# Patient Record
Sex: FEMALE | Race: WHITE | NOT HISPANIC OR LATINO | Employment: STUDENT | URBAN - METROPOLITAN AREA
[De-identification: names, ages, dates, MRNs, and addresses within clinical notes are randomized per-mention and may not be internally consistent; named-entity substitution may affect disease eponyms.]

---

## 2022-07-27 ENCOUNTER — OFFICE VISIT (OUTPATIENT)
Dept: URGENT CARE | Facility: URGENT CARE | Age: 22
End: 2022-07-27
Payer: COMMERCIAL

## 2022-07-27 VITALS
DIASTOLIC BLOOD PRESSURE: 66 MMHG | BODY MASS INDEX: 23.54 KG/M2 | TEMPERATURE: 98.4 F | HEIGHT: 67 IN | RESPIRATION RATE: 16 BRPM | OXYGEN SATURATION: 99 % | SYSTOLIC BLOOD PRESSURE: 116 MMHG | WEIGHT: 150 LBS | HEART RATE: 65 BPM

## 2022-07-27 DIAGNOSIS — L23.9 ALLERGIC DERMATITIS: ICD-10-CM

## 2022-07-27 DIAGNOSIS — L97.412 SKIN ULCER OF RIGHT HEEL WITH FAT LAYER EXPOSED (H): ICD-10-CM

## 2022-07-27 DIAGNOSIS — S91.301A OPEN WOUND OF RIGHT HEEL, INITIAL ENCOUNTER: Primary | ICD-10-CM

## 2022-07-27 LAB
BASOPHILS # BLD MANUAL: 0 10E3/UL (ref 0–0.2)
BASOPHILS NFR BLD MANUAL: 0 %
EOSINOPHIL # BLD MANUAL: 0.8 10E3/UL (ref 0–0.7)
EOSINOPHIL NFR BLD MANUAL: 7 %
ERYTHROCYTE [DISTWIDTH] IN BLOOD BY AUTOMATED COUNT: 12.6 % (ref 10–15)
HCT VFR BLD AUTO: 38.9 % (ref 35–47)
HGB BLD-MCNC: 13.1 G/DL (ref 11.7–15.7)
LYMPHOCYTES # BLD MANUAL: 2.4 10E3/UL (ref 0.8–5.3)
LYMPHOCYTES NFR BLD MANUAL: 20 %
MCH RBC QN AUTO: 30.6 PG (ref 26.5–33)
MCHC RBC AUTO-ENTMCNC: 33.7 G/DL (ref 31.5–36.5)
MCV RBC AUTO: 91 FL (ref 78–100)
MONOCYTES # BLD MANUAL: 1.1 10E3/UL (ref 0–1.3)
MONOCYTES NFR BLD MANUAL: 9 %
NEUTROPHILS # BLD MANUAL: 7.6 10E3/UL (ref 1.6–8.3)
NEUTROPHILS NFR BLD MANUAL: 64 %
PLAT MORPH BLD: ABNORMAL
PLATELET # BLD AUTO: 306 10E3/UL (ref 150–450)
RBC # BLD AUTO: 4.28 10E6/UL (ref 3.8–5.2)
RBC MORPH BLD: ABNORMAL
WBC # BLD AUTO: 11.8 10E3/UL (ref 4–11)

## 2022-07-27 PROCEDURE — 87077 CULTURE AEROBIC IDENTIFY: CPT | Performed by: NURSE PRACTITIONER

## 2022-07-27 PROCEDURE — 99204 OFFICE O/P NEW MOD 45 MIN: CPT | Performed by: NURSE PRACTITIONER

## 2022-07-27 PROCEDURE — 87186 SC STD MICRODIL/AGAR DIL: CPT | Performed by: NURSE PRACTITIONER

## 2022-07-27 PROCEDURE — 85027 COMPLETE CBC AUTOMATED: CPT | Performed by: NURSE PRACTITIONER

## 2022-07-27 PROCEDURE — 87070 CULTURE OTHR SPECIMN AEROBIC: CPT | Performed by: NURSE PRACTITIONER

## 2022-07-27 PROCEDURE — 36415 COLL VENOUS BLD VENIPUNCTURE: CPT | Performed by: NURSE PRACTITIONER

## 2022-07-27 PROCEDURE — 85007 BL SMEAR W/DIFF WBC COUNT: CPT | Performed by: NURSE PRACTITIONER

## 2022-07-27 RX ORDER — TRIAMCINOLONE ACETONIDE 1 MG/G
CREAM TOPICAL 2 TIMES DAILY
Qty: 80 G | Refills: 0 | Status: SHIPPED | OUTPATIENT
Start: 2022-07-27 | End: 2022-08-03

## 2022-07-27 NOTE — PROGRESS NOTES
"Chief Complaint   Patient presents with     Urgent Care     Allergic Reaction     Pt has itching hand and leg and swelling due to taking dicloxacillin.      SUBJECTIVE:  Saskia Enciso is a 22 year old female who presents to the clinic today with large pale white ulcer down past fatty tissue that developed in [place] a [place] ago, worsening in the past days despite 2 I&Ds and 2 different antibiotics. She just got home from [place] today and is supposed to move to [place] tomorrow. She had an allergic reaction to dicloxacillin after 2 doses, red burning swollen dry hands, feet, scalp. That is improving except palms are still red, dry and itchy. Now she has been on erythromycin for 4 days and had 2 separate I&Ds. She is still in pain at the right heel with swelling, redness, trouble bearing weight, 1x1 inch deep white ulceration. No fevers for over a week. No shortness of breath, throat closure, syncope, gi upset.    No past medical history on file.  No current outpatient medications on file prior to visit.  No current facility-administered medications on file prior to visit.    Social History     Tobacco Use     Smoking status: Never Smoker     Smokeless tobacco: Never Used   Substance Use Topics     Alcohol use: Not on file     Allergies   Allergen Reactions     Dicloxacillin Rash     Review of Systems   All systems negative except for those listed above in HPI.    EXAM:   /66   Pulse 65   Temp 98.4  F (36.9  C) (Temporal)   Resp 16   Ht 1.702 m (5' 7\")   Wt 68 kg (150 lb)   SpO2 99%   BMI 23.49 kg/m      Physical Exam  Vitals reviewed.   Constitutional:       General: She is not in acute distress.     Appearance: Normal appearance. She is not ill-appearing, toxic-appearing or diaphoretic.   HENT:      Head: Normocephalic and atraumatic.   Cardiovascular:      Rate and Rhythm: Normal rate.   Pulmonary:      Effort: Pulmonary effort is normal.   Musculoskeletal:         General: Swelling, tenderness and " "signs of injury present. Normal range of motion.   Skin:     General: Skin is warm and dry.      Findings: Erythema, lesion and rash present. No bruising.      Comments: Bilateral palms dry, red, edematous. Right calcaneous with 1x1\" white deep pale ulcer down past muscle tissue, surrounding erythema and tenderness.   Neurological:      General: No focal deficit present.      Mental Status: She is alert and oriented to person, place, and time.   Psychiatric:         Mood and Affect: Mood normal.         Behavior: Behavior normal.       Xray done in clinic read by me as negative for osteomyelitis.  Results for orders placed or performed in visit on 07/27/22   XR Calcaneus Right G/E 2 Views     Status: None (Preliminary result)    Narrative    CALCANEUS RIGHT TWO OR MORE VIEWS   7/27/2022 4:26 PM     HISTORY: Large pale white ulcer down past muscle tissue that developed  in [place] a month ago, worsening in the past days despite 2 I&Ds  and 2 different antibiotics, concern for osteomyelitis; Open wound of  right heel, initial encounter.    COMPARISON: None.      Impression    IMPRESSION: There is an ulcer posterolateral to the calcaneal  tuberosity. The underlying bone appears normal. There is no evidence  of osteomyelitis or foreign body.   Results for orders placed or performed in visit on 07/27/22   CBC with platelets and differential     Status: None (In process)    Narrative    The following orders were created for panel order CBC with platelets and differential.  Procedure                               Abnormality         Status                     ---------                               -----------         ------                     CBC with platelets and d...[[id number]]                      In process                   Please view results for these tests on the individual orders.     ASSESSMENT:    ICD-10-CM    1. Open wound of right heel, initial encounter  S91.301A XR Calcaneus Right G/E 2 Views     CBC with " platelets and differential     CBC with platelets and differential     Abscess Aerobic Bacterial Culture Routine   2. Allergic dermatitis  L23.9 triamcinolone (KENALOG) 0.1 % external cream   3. Skin ulcer of right heel with fat layer exposed (H)  L97.412      PLAN:    CBC improved WBC is 11.19 so slightly elevated, normal neutrophils  No evidence of sepsis    Right heel ulcer  Epsom salt warm water soapy soaks each day  Bacitracin zinc and new bandage  Continue erythromycin  Wound culture pending, we call if change in antibiotic is needed in 3 days  ER if fever, severe pain, rapid worsening, cannot walk, bone is showing    Triamcinolone to red dry hands  This is likely just cutaneous allergic reaction from dicloxacillin    Follow up with primary care provider with any problems, questions or concerns or if symptoms worsen or fail to improve. Patient agreed to plan and verbalized understanding.    Odalys Chirinos, [facility]

## 2022-07-27 NOTE — PATIENT INSTRUCTIONS
CBC improved WBC is 11.19 so slightly elevated, normal neutrophils  No evidence of sepsis    Right heel ulcer  Epsom salt warm water soapy soaks each day  Bacitracin zinc and new bandage  Continue erythromycin  Wound culture pending, we call if change in antibiotic is needed in 3 days  ER if fever, severe pain, rapid worsening, cannot walk, bone is showing    Triamcinolone to red dry hands  This is likely just cutaneous allergic reaction from dicloxacillin

## 2022-07-30 LAB — BACTERIA ABSC ANAEROBE+AEROBE CULT: ABNORMAL

## 2022-08-20 ENCOUNTER — HEALTH MAINTENANCE LETTER (OUTPATIENT)
Age: 22
End: 2022-08-20

## 2022-11-19 ENCOUNTER — HEALTH MAINTENANCE LETTER (OUTPATIENT)
Age: 22
End: 2022-11-19

## 2023-09-10 ENCOUNTER — HEALTH MAINTENANCE LETTER (OUTPATIENT)
Age: 23
End: 2023-09-10

## 2024-11-03 ENCOUNTER — HEALTH MAINTENANCE LETTER (OUTPATIENT)
Age: 24
End: 2024-11-03